# Patient Record
Sex: MALE | Race: WHITE | NOT HISPANIC OR LATINO | URBAN - METROPOLITAN AREA
[De-identification: names, ages, dates, MRNs, and addresses within clinical notes are randomized per-mention and may not be internally consistent; named-entity substitution may affect disease eponyms.]

---

## 2018-10-25 ENCOUNTER — EMERGENCY (EMERGENCY)
Facility: HOSPITAL | Age: 28
LOS: 1 days | Discharge: HOME | End: 2018-10-25
Admitting: EMERGENCY MEDICINE
Payer: COMMERCIAL

## 2018-10-25 VITALS
SYSTOLIC BLOOD PRESSURE: 145 MMHG | HEART RATE: 108 BPM | WEIGHT: 190.04 LBS | RESPIRATION RATE: 17 BRPM | TEMPERATURE: 98 F | DIASTOLIC BLOOD PRESSURE: 83 MMHG | OXYGEN SATURATION: 97 %

## 2018-10-25 DIAGNOSIS — S61.002A UNSPECIFIED OPEN WOUND OF LEFT THUMB WITHOUT DAMAGE TO NAIL, INITIAL ENCOUNTER: ICD-10-CM

## 2018-10-25 DIAGNOSIS — S61.012A LACERATION WITHOUT FOREIGN BODY OF LEFT THUMB WITHOUT DAMAGE TO NAIL, INITIAL ENCOUNTER: ICD-10-CM
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
## 2018-10-25 PROCEDURE — 99282 EMERGENCY DEPT VISIT SF MDM: CPT

## 2018-10-25 PROCEDURE — 99283 EMERGENCY DEPT VISIT LOW MDM: CPT

## 2018-10-25 RX ORDER — ACETAMINOPHEN 500 MG
975 TABLET ORAL ONCE
Qty: 0 | Refills: 0 | Status: COMPLETED | OUTPATIENT
Start: 2018-10-25 | End: 2018-10-25

## 2018-10-25 RX ADMIN — Medication 975 MILLIGRAM(S): at 20:12

## 2018-10-25 NOTE — ED PROVIDER NOTE - MEDICAL DECISION MAKING DETAILS
29 yo male in the ER with superficial skin avulsion to his left thumb, accidentally sustained with a kitchen knife. Bleeding stopped after pressure dressing applied. wound care explained.

## 2018-10-25 NOTE — ED ADULT NURSE NOTE - OBJECTIVE STATEMENT
left thumb laceration while cooking today.  pressure dressing applied. Tetanus received last year. denies any use of blood thinners.

## 2018-10-25 NOTE — ED PROVIDER NOTE - OBJECTIVE STATEMENT
27 yo male accidentally sliced off the very tip of his left thumb tonight at home with a kitchen knife. Pt noted bleeding and was concerned if he needs sutures. Pt denies numbness or tingling to his injured digit. Pt reports his fingernail is intact. last tetanus booster UTD

## 2018-10-25 NOTE — ED PROVIDER NOTE - SKIN, MLM
Skin normal color for race, warm, dry and intact. No evidence of rash.  small superficial skin avulsion to the distal left 1st digit

## 2020-10-13 NOTE — ED PROVIDER NOTE - CHIEF COMPLAINT
The patient is a 28y Male complaining of lacerations. 5-Fu Counseling: 5-Fluorouracil Counseling:  I discussed with the patient the risks of 5-fluorouracil including but not limited to erythema, scaling, itching, weeping, crusting, and pain.

## 2022-10-26 NOTE — ED ADULT NURSE NOTE - CHIEF COMPLAINT QUOTE
Patient has graduated from the Transitions of Care Coordination  program on 10/26/2022. Patient/family has the ability to self-manage at this time Care management goals have been completed. Patient was not referred to the [facility] team for further management. Goals Addressed                   This Visit's Progress     COMPLETED: Prevent complications post hospitalization. 9/26/2022  Pt confirmed follow up appt with Dr. Jordana Tse today, 9/26/2022. Pt verified medications. Pt reports that [facility] on 10/6/2022 for one week and meet with EP physician. Pt reports no symptoms with A Flutter other than feeling tired for about 3 days. Pt reminded to seek help as needed. Reviewed red flags with pt and pt states FAST and call 911. Pt is agreeable to a call in 7 days to review any changes in symptoms such as increased SOB or fatigue. Luther Jordan RN [address] [facility], Care Transitions Nurse, [phone]     10/4/2022  Pt reports that he continues with some SOB and continues to move slowly. Pt confirmed that he has been in contact with PCP who, pt reports, is working with Dr. Jordana Tse, cardiologist.  Confirmed with pt that he will have heart monitor placed on 10/6/2022 for one week and follow up with EP physician on 10/11/2022. Dr Benjamin Ureña? Pt is agreeable to a call in 8 days to review symptoms and EP follow up. Pt reminded to seek emergent help is SOB worsens. We discussed that pt can explore what his pulse feels like to help him with education about arrhythmia. Luther Jordan RN [address] [facility], Care Transitions Nurse, [phone]     10/14/2022  Pt reports that he is wearing a holter monitor and may be scheduled for a cardiac ablation in December depending on results from monitor. Pt reports no further episodes of extreme fatigue or SOB. Pt states understanding to seek emergent help is baseline health status changes.   Pt is agreeable to a billie ella 7 days to review changes in baseline health or episodes of SOB. Reather Krabbe RN [address] [facility], Care Transitions Nurse, [phone]     10/26/2022  Left voicemail stating my name, CTN with [employer], date and time of call, and return telephone number. Reather Krabbe RN [facility], Care Transitions Nurse, [phone]               Patient has Care Transition Nurse's contact information for any further questions, concerns, or needs. Patients upcoming visits:  No future appointments. left thumb laceration while cooking today.  Bleeding controlled.  Tetanus received last year